# Patient Record
Sex: MALE | Race: BLACK OR AFRICAN AMERICAN | URBAN - METROPOLITAN AREA
[De-identification: names, ages, dates, MRNs, and addresses within clinical notes are randomized per-mention and may not be internally consistent; named-entity substitution may affect disease eponyms.]

---

## 2017-10-10 ENCOUNTER — APPOINTMENT (OUTPATIENT)
Dept: URBAN - METROPOLITAN AREA SURGERY 9 | Age: 47
Setting detail: DERMATOLOGY
End: 2017-10-10

## 2017-10-10 DIAGNOSIS — D22 MELANOCYTIC NEVI: ICD-10-CM

## 2017-10-10 DIAGNOSIS — L81.4 OTHER MELANIN HYPERPIGMENTATION: ICD-10-CM

## 2017-10-10 DIAGNOSIS — L72.8 OTHER FOLLICULAR CYSTS OF THE SKIN AND SUBCUTANEOUS TISSUE: ICD-10-CM

## 2017-10-10 DIAGNOSIS — L60.8 OTHER NAIL DISORDERS: ICD-10-CM

## 2017-10-10 PROBLEM — E13.9 OTHER SPECIFIED DIABETES MELLITUS WITHOUT COMPLICATIONS: Status: ACTIVE | Noted: 2017-10-10

## 2017-10-10 PROBLEM — J45.909 UNSPECIFIED ASTHMA, UNCOMPLICATED: Status: ACTIVE | Noted: 2017-10-10

## 2017-10-10 PROBLEM — I48.91 UNSPECIFIED ATRIAL FIBRILLATION: Status: ACTIVE | Noted: 2017-10-10

## 2017-10-10 PROBLEM — I10 ESSENTIAL (PRIMARY) HYPERTENSION: Status: ACTIVE | Noted: 2017-10-10

## 2017-10-10 PROBLEM — D22.72 MELANOCYTIC NEVI OF LEFT LOWER LIMB, INCLUDING HIP: Status: ACTIVE | Noted: 2017-10-10

## 2017-10-10 PROCEDURE — OTHER COUNSELING: OTHER

## 2017-10-10 PROCEDURE — OTHER OTHER: OTHER

## 2017-10-10 PROCEDURE — OTHER OBSERVATION AND MEASURE: OTHER

## 2017-10-10 PROCEDURE — OTHER OBSERVATION: OTHER

## 2017-10-10 PROCEDURE — OTHER REASSURANCE: OTHER

## 2017-10-10 PROCEDURE — 99203 OFFICE O/P NEW LOW 30 MIN: CPT

## 2017-10-10 ASSESSMENT — LOCATION ZONE DERM
LOCATION ZONE: LIP
LOCATION ZONE: LIP
LOCATION ZONE: TOENAIL
LOCATION ZONE: FEET
LOCATION ZONE: FINGERNAIL
LOCATION ZONE: EYELID

## 2017-10-10 ASSESSMENT — LOCATION SIMPLE DESCRIPTION DERM
LOCATION SIMPLE: RIGHT INFERIOR MUCOSAL LIP
LOCATION SIMPLE: LEFT INFERIOR MUCOSAL LIP
LOCATION SIMPLE: LEFT INFERIOR MUCOSAL LIP
LOCATION SIMPLE: RIGHT SUPERIOR MUCOSAL LIP
LOCATION SIMPLE: LEFT SUPERIOR MUCOSAL LIP
LOCATION SIMPLE: RIGHT SUPERIOR EYELID
LOCATION SIMPLE: LEFT PLANTAR SURFACE
LOCATION SIMPLE: RIGHT GREAT TOE
LOCATION SIMPLE: RIGHT INFERIOR MUCOSAL LIP
LOCATION SIMPLE: LEFT INDEX FINGER
LOCATION SIMPLE: RIGHT 2ND TOE

## 2017-10-10 ASSESSMENT — LOCATION DETAILED DESCRIPTION DERM
LOCATION DETAILED: RIGHT INFERIOR MUCOSAL LIP
LOCATION DETAILED: LEFT PLANTAR FOREFOOT OVERLYING 5TH METATARSAL
LOCATION DETAILED: RIGHT SUPERIOR MUCOSAL LIP
LOCATION DETAILED: LEFT INDEX FINGERNAIL
LOCATION DETAILED: LEFT SUPERIOR MUCOSAL LIP
LOCATION DETAILED: LEFT INFERIOR MUCOSAL LIP
LOCATION DETAILED: LEFT INFERIOR MUCOSAL LIP
LOCATION DETAILED: RIGHT LATERAL SUPERIOR EYELID
LOCATION DETAILED: RIGHT 2ND TOENAIL
LOCATION DETAILED: RIGHT INFERIOR MUCOSAL LIP
LOCATION DETAILED: LEFT PLANTAR FOREFOOT OVERLYING 3RD METATARSAL
LOCATION DETAILED: RIGHT GREAT TOENAIL

## 2017-10-10 NOTE — PROCEDURE: REASSURANCE
Detail Level: Detailed
Detail Level: Zone
Additional Notes (Optional): Continue to monitor. Wonder if the toes are partially due to trauma

## 2017-10-10 NOTE — PROCEDURE: OTHER
Note Text (......Xxx Chief Complaint.): This diagnosis correlates with the
Detail Level: Detailed
Other (Free Text): Lesion of concern. Dx and potential treatment options discussed. Will decline treatment for now.

## 2018-06-06 ENCOUNTER — APPOINTMENT (OUTPATIENT)
Dept: URBAN - METROPOLITAN AREA SURGERY 9 | Age: 48
Setting detail: DERMATOLOGY
End: 2018-06-06

## 2018-06-06 DIAGNOSIS — L92.0 GRANULOMA ANNULARE: ICD-10-CM

## 2018-06-06 DIAGNOSIS — L81.4 OTHER MELANIN HYPERPIGMENTATION: ICD-10-CM

## 2018-06-06 DIAGNOSIS — L60.8 OTHER NAIL DISORDERS: ICD-10-CM

## 2018-06-06 DIAGNOSIS — D22 MELANOCYTIC NEVI: ICD-10-CM

## 2018-06-06 PROBLEM — D22.72 MELANOCYTIC NEVI OF LEFT LOWER LIMB, INCLUDING HIP: Status: ACTIVE | Noted: 2018-06-06

## 2018-06-06 PROBLEM — D22.71 MELANOCYTIC NEVI OF RIGHT LOWER LIMB, INCLUDING HIP: Status: ACTIVE | Noted: 2018-06-06

## 2018-06-06 PROCEDURE — OTHER COUNSELING: OTHER

## 2018-06-06 PROCEDURE — OTHER OBSERVATION: OTHER

## 2018-06-06 PROCEDURE — OTHER REASSURANCE: OTHER

## 2018-06-06 PROCEDURE — 99214 OFFICE O/P EST MOD 30 MIN: CPT

## 2018-06-06 PROCEDURE — OTHER OBSERVATION AND MEASURE: OTHER

## 2018-06-06 PROCEDURE — OTHER PRESCRIPTION: OTHER

## 2018-06-06 RX ORDER — TRIAMCINOLONE ACETONIDE 1 MG/G
CREAM TOPICAL BID
Qty: 1 | Refills: 1 | Status: ERX | COMMUNITY
Start: 2018-06-06

## 2018-06-06 ASSESSMENT — LOCATION DETAILED DESCRIPTION DERM
LOCATION DETAILED: LEFT 3RD TOENAIL
LOCATION DETAILED: RIGHT GREAT TOENAIL
LOCATION DETAILED: LEFT INFERIOR MUCOSAL LIP
LOCATION DETAILED: LEFT PLANTAR FOREFOOT OVERLYING 4TH METATARSAL
LOCATION DETAILED: RIGHT MEDIAL PLANTAR MIDFOOT
LOCATION DETAILED: RIGHT 2ND TOENAIL
LOCATION DETAILED: RIGHT INFERIOR VERMILION LIP
LOCATION DETAILED: LEFT INDEX FINGERNAIL
LOCATION DETAILED: LEFT ANTERIOR PROXIMAL THIGH
LOCATION DETAILED: RIGHT LATERAL PLANTAR MIDFOOT

## 2018-06-06 ASSESSMENT — LOCATION SIMPLE DESCRIPTION DERM
LOCATION SIMPLE: LEFT PLANTAR SURFACE
LOCATION SIMPLE: LEFT INDEX FINGER
LOCATION SIMPLE: RIGHT GREAT TOE
LOCATION SIMPLE: LEFT INFERIOR MUCOSAL LIP
LOCATION SIMPLE: RIGHT PLANTAR SURFACE
LOCATION SIMPLE: LEFT THIGH
LOCATION SIMPLE: LEFT 3RD TOE
LOCATION SIMPLE: RIGHT INFERIOR LIP
LOCATION SIMPLE: RIGHT 2ND TOE

## 2018-06-06 ASSESSMENT — LOCATION ZONE DERM
LOCATION ZONE: FINGERNAIL
LOCATION ZONE: FEET
LOCATION ZONE: LIP
LOCATION ZONE: TOENAIL
LOCATION ZONE: LEG

## 2018-06-06 NOTE — PROCEDURE: OBSERVATION
Instructions (Optional): Monitor for changes
Detail Level: Detailed
Size Of Lesion: 2.5mm
Size Of Lesion: 4mm
Size Of Lesion: 3.5mm

## 2018-06-06 NOTE — PROCEDURE: REASSURANCE
Detail Level: Simple
Additional Notes (Optional): Continue to monitor. Toe nails are partially dark due to trauma

## 2025-06-18 ENCOUNTER — APPOINTMENT (OUTPATIENT)
Dept: URBAN - METROPOLITAN AREA CLINIC 186 | Age: 55
Setting detail: DERMATOLOGY
End: 2025-06-18

## 2025-06-18 DIAGNOSIS — L72.0 EPIDERMAL CYST: ICD-10-CM

## 2025-06-18 PROBLEM — D48.5 NEOPLASM OF UNCERTAIN BEHAVIOR OF SKIN: Status: ACTIVE | Noted: 2025-06-18

## 2025-06-18 PROCEDURE — OTHER DEFER: OTHER

## 2025-06-18 PROCEDURE — 99203 OFFICE O/P NEW LOW 30 MIN: CPT

## 2025-06-18 PROCEDURE — OTHER COUNSELING: OTHER

## 2025-06-18 PROCEDURE — OTHER CONSULTATION EXCISION: OTHER

## 2025-06-18 ASSESSMENT — LOCATION SIMPLE DESCRIPTION DERM: LOCATION SIMPLE: RIGHT SUPERIOR EYELID

## 2025-06-18 ASSESSMENT — LOCATION ZONE DERM: LOCATION ZONE: EYELID

## 2025-06-18 ASSESSMENT — LOCATION DETAILED DESCRIPTION DERM: LOCATION DETAILED: RIGHT MEDIAL SUPERIOR EYELID

## 2025-07-08 ENCOUNTER — APPOINTMENT (OUTPATIENT)
Dept: URBAN - METROPOLITAN AREA CLINIC 186 | Age: 55
Setting detail: DERMATOLOGY
End: 2025-07-08

## 2025-07-08 DIAGNOSIS — L72.0 EPIDERMAL CYST: ICD-10-CM

## 2025-07-08 PROCEDURE — OTHER EXCISION: OTHER

## 2025-07-08 ASSESSMENT — LOCATION ZONE DERM: LOCATION ZONE: EYELID

## 2025-07-08 ASSESSMENT — LOCATION DETAILED DESCRIPTION DERM: LOCATION DETAILED: RIGHT MEDIAL SUPERIOR EYELID

## 2025-07-08 ASSESSMENT — LOCATION SIMPLE DESCRIPTION DERM: LOCATION SIMPLE: RIGHT SUPERIOR EYELID
